# Patient Record
Sex: FEMALE | Race: ASIAN | URBAN - METROPOLITAN AREA
[De-identification: names, ages, dates, MRNs, and addresses within clinical notes are randomized per-mention and may not be internally consistent; named-entity substitution may affect disease eponyms.]

---

## 2017-04-26 LAB
HBV SURFACE AG SERPL QL IA: NORMAL
HIV 1+2 AB+HIV1 P24 AG SERPL QL IA: NORMAL
RUBELLA ABY IGG: NORMAL
T PALLIDUM IGG SER QL: NORMAL

## 2017-11-03 LAB — GROUP B STREP PCR: NEGATIVE

## 2017-12-01 ENCOUNTER — HOSPITAL ENCOUNTER (INPATIENT)
Facility: CLINIC | Age: 32
LOS: 2 days | Discharge: HOME | End: 2017-12-03
Attending: OBSTETRICS & GYNECOLOGY | Admitting: OBSTETRICS & GYNECOLOGY
Payer: COMMERCIAL

## 2017-12-01 LAB
ABO + RH BLD: NORMAL
ABO + RH BLD: NORMAL
SPECIMEN EXP DATE BLD: NORMAL

## 2017-12-01 PROCEDURE — 12000029 ZZH R&B OB INTERMEDIATE

## 2017-12-01 PROCEDURE — 0KQM0ZZ REPAIR PERINEUM MUSCLE, OPEN APPROACH: ICD-10-PCS | Performed by: OBSTETRICS & GYNECOLOGY

## 2017-12-01 PROCEDURE — 86780 TREPONEMA PALLIDUM: CPT | Performed by: OBSTETRICS & GYNECOLOGY

## 2017-12-01 PROCEDURE — 59025 FETAL NON-STRESS TEST: CPT

## 2017-12-01 PROCEDURE — 25000125 ZZHC RX 250: Performed by: OBSTETRICS & GYNECOLOGY

## 2017-12-01 PROCEDURE — 25000128 H RX IP 250 OP 636: Performed by: OBSTETRICS & GYNECOLOGY

## 2017-12-01 PROCEDURE — 72200001 ZZH LABOR CARE VAGINAL DELIVERY SINGLE

## 2017-12-01 PROCEDURE — 99215 OFFICE O/P EST HI 40 MIN: CPT

## 2017-12-01 PROCEDURE — 86900 BLOOD TYPING SEROLOGIC ABO: CPT | Performed by: OBSTETRICS & GYNECOLOGY

## 2017-12-01 PROCEDURE — 25000132 ZZH RX MED GY IP 250 OP 250 PS 637: Performed by: OBSTETRICS & GYNECOLOGY

## 2017-12-01 PROCEDURE — 86901 BLOOD TYPING SEROLOGIC RH(D): CPT | Performed by: OBSTETRICS & GYNECOLOGY

## 2017-12-01 RX ORDER — METHYLERGONOVINE MALEATE 0.2 MG/ML
200 INJECTION INTRAVENOUS
Status: DISCONTINUED | OUTPATIENT
Start: 2017-12-01 | End: 2017-12-02

## 2017-12-01 RX ORDER — PRENATAL VIT/IRON FUM/FOLIC AC 27MG-0.8MG
1 TABLET ORAL DAILY
COMMUNITY

## 2017-12-01 RX ORDER — ACETAMINOPHEN 325 MG/1
650 TABLET ORAL EVERY 4 HOURS PRN
Status: DISCONTINUED | OUTPATIENT
Start: 2017-12-01 | End: 2017-12-02

## 2017-12-01 RX ORDER — IBUPROFEN 800 MG/1
800 TABLET, FILM COATED ORAL
Status: COMPLETED | OUTPATIENT
Start: 2017-12-01 | End: 2017-12-01

## 2017-12-01 RX ORDER — FENTANYL CITRATE 50 UG/ML
50-100 INJECTION, SOLUTION INTRAMUSCULAR; INTRAVENOUS
Status: DISCONTINUED | OUTPATIENT
Start: 2017-12-01 | End: 2017-12-02

## 2017-12-01 RX ORDER — OXYCODONE AND ACETAMINOPHEN 5; 325 MG/1; MG/1
1 TABLET ORAL
Status: DISCONTINUED | OUTPATIENT
Start: 2017-12-01 | End: 2017-12-02

## 2017-12-01 RX ORDER — SODIUM CHLORIDE, SODIUM LACTATE, POTASSIUM CHLORIDE, CALCIUM CHLORIDE 600; 310; 30; 20 MG/100ML; MG/100ML; MG/100ML; MG/100ML
INJECTION, SOLUTION INTRAVENOUS CONTINUOUS
Status: DISCONTINUED | OUTPATIENT
Start: 2017-12-01 | End: 2017-12-02

## 2017-12-01 RX ORDER — ONDANSETRON 2 MG/ML
4 INJECTION INTRAMUSCULAR; INTRAVENOUS EVERY 6 HOURS PRN
Status: DISCONTINUED | OUTPATIENT
Start: 2017-12-01 | End: 2017-12-02

## 2017-12-01 RX ORDER — OXYTOCIN 10 [USP'U]/ML
10 INJECTION, SOLUTION INTRAMUSCULAR; INTRAVENOUS
Status: DISCONTINUED | OUTPATIENT
Start: 2017-12-01 | End: 2017-12-02

## 2017-12-01 RX ORDER — ONDANSETRON 2 MG/ML
4 INJECTION INTRAMUSCULAR; INTRAVENOUS EVERY 6 HOURS PRN
Status: DISCONTINUED | OUTPATIENT
Start: 2017-12-01 | End: 2017-12-01

## 2017-12-01 RX ORDER — OXYTOCIN/0.9 % SODIUM CHLORIDE 30/500 ML
100-340 PLASTIC BAG, INJECTION (ML) INTRAVENOUS CONTINUOUS PRN
Status: COMPLETED | OUTPATIENT
Start: 2017-12-01 | End: 2017-12-01

## 2017-12-01 RX ORDER — NALOXONE HYDROCHLORIDE 0.4 MG/ML
.1-.4 INJECTION, SOLUTION INTRAMUSCULAR; INTRAVENOUS; SUBCUTANEOUS
Status: DISCONTINUED | OUTPATIENT
Start: 2017-12-01 | End: 2017-12-02

## 2017-12-01 RX ORDER — CARBOPROST TROMETHAMINE 250 UG/ML
250 INJECTION, SOLUTION INTRAMUSCULAR
Status: DISCONTINUED | OUTPATIENT
Start: 2017-12-01 | End: 2017-12-02

## 2017-12-01 RX ADMIN — OXYTOCIN-SODIUM CHLORIDE 0.9% IV SOLN 30 UNIT/500ML 340 ML/HR: 30-0.9/5 SOLUTION at 23:25

## 2017-12-01 RX ADMIN — IBUPROFEN 800 MG: 800 TABLET, FILM COATED ORAL at 23:52

## 2017-12-01 RX ADMIN — SODIUM CHLORIDE, POTASSIUM CHLORIDE, SODIUM LACTATE AND CALCIUM CHLORIDE: 600; 310; 30; 20 INJECTION, SOLUTION INTRAVENOUS at 20:25

## 2017-12-01 RX ADMIN — LIDOCAINE HYDROCHLORIDE 20 ML: 10 INJECTION, SOLUTION INFILTRATION; PERINEURAL at 23:37

## 2017-12-01 NOTE — IP AVS SNAPSHOT
[facility]    [address] [facility] [place] [address]    Phone:  [phone]    Fax:  [phone]                                       After Visit Summary   12/1/2017    Guerda West    MRN: [id number]           After Visit Summary Signature Page     I have received my discharge instructions, and my questions have been answered. I have discussed any challenges I see with this plan with the nurse or doctor.    ..........................................................................................................................................  Patient/Patient Representative Signature      ..........................................................................................................................................  Patient Representative Print Name and Relationship to Patient    ..................................................               ................................................  Date                                            Time    ..........................................................................................................................................  Reviewed by Signature/Title    ...................................................              ..............................................  Date                                                            Time

## 2017-12-01 NOTE — PROVIDER NOTIFICATION
17 1745   Provider Notification   Provider Name/Title    Method of Notification In Department   Request Evaluate - Remote   Notification Reason Patient Arrived;Uterine Activity;Status Update;SVE     Dr. Tabor in department, updated on patient's arrival of being . Patient was seen in office this late afternoon and dilated 5-. Patient denies being aware of contractions, LOF or vaginal bleeding. External fetal monitor applied. Dr. Tabor viewed monitor tracing and will have patient ambulate for an hour and recheck. Data base completed.

## 2017-12-01 NOTE — IP AVS SNAPSHOT
MRN:[id number]                      After Visit Summary   12/1/2017    Guerda West    MRN: [id number]           Thank you!     Thank you for choosing [facility] for your care. Our goal is always to provide you with excellent care. Hearing back from our patients is one way we can continue to improve our services. Please take a few minutes to complete the written survey that you may receive in the mail after you visit. If you would like to speak to someone directly about your visit please contact Patient Relations at [phone]. Thank you!          Patient Information     Date Of Birth          1985        Designated Caregiver       Most Recent Value    Caregiver    Will someone help with your care after discharge? no      About your hospital stay     You were admitted on:  December 1, 2017 You last received care in the:  [facility]    You were discharged on:  December 3, 2017        Reason for your hospital stay       Maternity care                  Who to Call     For medical emergencies, please call 911.  For non-urgent questions about your medical care, please call your primary care provider or clinic, [phone]          Attending Provider     Provider Specialty    Zulema Tabor DO OB/Gyn       Primary Care Provider Office Phone # Fax #    [facility] [phone]      After Care Instructions     Activity       Review discharge instructions            Diet       Resume previous diet            Discharge Instructions - Postpartum visit       Schedule postpartum visit with your provider and return to clinic in 6 weeks.                  Follow-up Appointments     Follow Up and recommended labs and tests       6 weeks for postpartum visit                  Further instructions from your care team       Lactation Consultant [phone]    Home Care [phone]    Postpartum Vaginal Delivery Instructions    Activity        Ask family and friends for help when you need it.    Do not place anything in your vagina for 6 weeks.    You are not restricted on other activities, but take it easy for a few weeks to allow your body to recover from delivery.  You are able to do any activities you feel up to that point.    No driving until you have stopped taking your pain medications (usually two weeks after delivery).     Call your health care provider if you have any of these symptoms:       Increased pain, swelling, redness, or fluid around your stiches from an episiotomy or perineal tear.    A fever above 100.4 F (38 C) with or without chills when placing a thermometer under your tongue.    You soak a sanitary pad with blood within 1 hour, or you see blood clots larger than a golf ball.    Bleeding that lasts more than 6 weeks.    Vaginal discharge that smells bad.    Severe pain, cramping or tenderness in your lower belly area.    A need to urinate more frequently (use the toilet more often), more urgently (use the toilet very quickly), or it burns when you urinate.    Nausea and vomiting.    Redness, swelling or pain around a vein in your leg.    Problems breastfeeding or a red or painful area on your breast.    Chest pain and cough or are gasping for air.    Problems coping with sadness, anxiety, or depression.  If you have any concerns about hurting yourself or the baby, call your provider immediately.     You have questions or concerns after you return home.     Keep your hands clean:  Always wash your hands before touching your perineal area and stitches.  This helps reduce your risk of infection.  If your hands aren't dirty, you may use an alcohol hand-rub to clean your hands. Keep your nails clean and short.        Pending Results     No orders found from 11/29/2017 to 12/2/2017.            Statement of Approval     Ordered          12/03/17 1032  I have reviewed and agree with all the recommendations and orders detailed in this  "document.  EFFECTIVE NOW     Approved and electronically signed by:  Zulema Tabor DO             Admission Information     Date & Time Provider Department Dept. Phone    2017 Zulema Tabor DO [facility] [phone]      Your Vitals Were     Blood Pressure Pulse Temperature Respirations Height Weight    125/71 72 98.3  F (36.8  C) (Oral) 16 1.676 m (5' 6\") 95.7 kg (211 lb)    BMI (Body Mass Index)                   34.06 kg/m2           MyChart Information     [facility] lets you send messages to your doctor, view your test results, renew your prescriptions, schedule appointments and more. To sign up, go to [email or web address] . Click on \"Log in\" on the left side of the screen, which will take you to the Welcome page. Then click on \"Sign up Now\" on the right side of the page.     You will be asked to enter the access code listed below, as well as some personal information. Please follow the directions to create your username and password.     Your access code is: [email or web address]  Expires: 3/3/2018 10:02 AM     Your access code will  in 90 days. If you need help or a new code, please call your [facility] clinic or [phone].        Care EveryWhere ID     This is your Care EveryWhere ID. This could be used by other organizations to access your [facility] medical records  [id number]        Equal Access to Services     BRUCE [place] AH: Hadii robert molinao Soinessa, waaxda luqadaha, qaybta kaalmada sukhdev, daryl hernandez . So [facility] [phone].    ATENCIÓN: Si habla español, tiene a kelley disposición servicios gratuitos de asistencia lingüística. Llame al [phone].    We comply with applicable federal civil rights laws and [place] laws. We do not discriminate on the basis of race, color, national origin, age, disability, sex, sexual orientation, or gender identity.               Review of your medicines      START taking        Dose / " Directions    acetaminophen 325 MG tablet   Commonly known as:  TYLENOL        Dose:  650 mg   Take 2 tablets (650 mg) by mouth every 4 hours as needed for mild pain or fever (greater than or equal to 38? C /100.4? F (oral) or 38.5? C/ 101.4? F (core).)   Quantity:  100 tablet   Refills:  0         CONTINUE these medicines which have NOT CHANGED        Dose / Directions    BENADRYL PO        Dose:  50 mg   Take 50 mg by mouth daily as needed.   Refills:  0       ibuprofen 400-800 mg tablet   Commonly known as:  ADVIL,MOTRIN        Dose:  800 mg   Take 2 tablets by mouth every 6 hours as needed (cramping).   Quantity:  40 tablet   Refills:  1       prenatal multivitamin plus iron 27-0.8 MG Tabs per tablet        Dose:  1 tablet   Take 1 tablet by mouth daily   Refills:  0       VISTARIL PO   Indication:  restless leg syndrome        Dose:  25 mg   Take 25 mg by mouth daily   Refills:  0         STOP taking     nitroFURantoin (macrocrystal-monohydrate) 100 MG capsule   Commonly known as:  MACROBID                Where to get your medicines      Some of these will need a paper prescription and others can be bought over the counter. Ask your nurse if you have questions.     You don't need a prescription for these medications     acetaminophen 325 MG tablet                Protect others around you: Learn how to safely use, store and throw away your medicines at www.disposemymeds.org.             Medication List: This is a list of all your medications and when to take them. Check marks below indicate your daily home schedule. Keep this list as a reference.      Medications           Morning Afternoon Evening Bedtime As Needed    acetaminophen 325 MG tablet   Commonly known as:  TYLENOL   Take 2 tablets (650 mg) by mouth every 4 hours as needed for mild pain or fever (greater than or equal to 38? C /100.4? F (oral) or 38.5? C/ 101.4? F (core).)                                BENADRYL PO   Take 50 mg by mouth daily as  needed.                                ibuprofen 400-800 mg tablet   Commonly known as:  ADVIL,MOTRIN   Take 2 tablets by mouth every 6 hours as needed (cramping).                                prenatal multivitamin plus iron 27-0.8 MG Tabs per tablet   Take 1 tablet by mouth daily                                VISTARIL PO   Take 25 mg by mouth daily

## 2017-12-02 LAB — T PALLIDUM IGG+IGM SER QL: NEGATIVE

## 2017-12-02 PROCEDURE — 12000027 ZZH R&B OB

## 2017-12-02 PROCEDURE — 25000132 ZZH RX MED GY IP 250 OP 250 PS 637: Performed by: OBSTETRICS & GYNECOLOGY

## 2017-12-02 PROCEDURE — 25000125 ZZHC RX 250: Performed by: OBSTETRICS & GYNECOLOGY

## 2017-12-02 RX ORDER — AMOXICILLIN 250 MG
2 CAPSULE ORAL 2 TIMES DAILY PRN
Status: DISCONTINUED | OUTPATIENT
Start: 2017-12-02 | End: 2017-12-03 | Stop reason: HOSPADM

## 2017-12-02 RX ORDER — LANOLIN 100 %
OINTMENT (GRAM) TOPICAL
Status: DISCONTINUED | OUTPATIENT
Start: 2017-12-02 | End: 2017-12-03 | Stop reason: HOSPADM

## 2017-12-02 RX ORDER — IBUPROFEN 800 MG/1
800 TABLET, FILM COATED ORAL EVERY 6 HOURS PRN
Status: DISCONTINUED | OUTPATIENT
Start: 2017-12-02 | End: 2017-12-03 | Stop reason: HOSPADM

## 2017-12-02 RX ORDER — METHYLERGONOVINE MALEATE 0.2 MG/ML
200 INJECTION INTRAVENOUS
Status: DISCONTINUED | OUTPATIENT
Start: 2017-12-02 | End: 2017-12-03 | Stop reason: HOSPADM

## 2017-12-02 RX ORDER — OXYCODONE HYDROCHLORIDE 5 MG/1
5 TABLET ORAL EVERY 4 HOURS PRN
Status: DISCONTINUED | OUTPATIENT
Start: 2017-12-02 | End: 2017-12-03 | Stop reason: HOSPADM

## 2017-12-02 RX ORDER — OXYTOCIN/0.9 % SODIUM CHLORIDE 30/500 ML
340 PLASTIC BAG, INJECTION (ML) INTRAVENOUS CONTINUOUS PRN
Status: DISCONTINUED | OUTPATIENT
Start: 2017-12-02 | End: 2017-12-03 | Stop reason: HOSPADM

## 2017-12-02 RX ORDER — BISACODYL 10 MG
10 SUPPOSITORY, RECTAL RECTAL DAILY PRN
Status: DISCONTINUED | OUTPATIENT
Start: 2017-12-04 | End: 2017-12-03 | Stop reason: HOSPADM

## 2017-12-02 RX ORDER — OXYTOCIN 10 [USP'U]/ML
10 INJECTION, SOLUTION INTRAMUSCULAR; INTRAVENOUS
Status: DISCONTINUED | OUTPATIENT
Start: 2017-12-02 | End: 2017-12-03 | Stop reason: HOSPADM

## 2017-12-02 RX ORDER — HYDROCORTISONE 2.5 %
CREAM (GRAM) TOPICAL 3 TIMES DAILY PRN
Status: DISCONTINUED | OUTPATIENT
Start: 2017-12-02 | End: 2017-12-03 | Stop reason: HOSPADM

## 2017-12-02 RX ORDER — OXYTOCIN/0.9 % SODIUM CHLORIDE 30/500 ML
100 PLASTIC BAG, INJECTION (ML) INTRAVENOUS CONTINUOUS
Status: DISCONTINUED | OUTPATIENT
Start: 2017-12-02 | End: 2017-12-03 | Stop reason: HOSPADM

## 2017-12-02 RX ORDER — AMOXICILLIN 250 MG
1 CAPSULE ORAL 2 TIMES DAILY PRN
Status: DISCONTINUED | OUTPATIENT
Start: 2017-12-02 | End: 2017-12-03 | Stop reason: HOSPADM

## 2017-12-02 RX ORDER — NALOXONE HYDROCHLORIDE 0.4 MG/ML
.1-.4 INJECTION, SOLUTION INTRAMUSCULAR; INTRAVENOUS; SUBCUTANEOUS
Status: DISCONTINUED | OUTPATIENT
Start: 2017-12-02 | End: 2017-12-03 | Stop reason: HOSPADM

## 2017-12-02 RX ORDER — MISOPROSTOL 200 UG/1
400 TABLET ORAL
Status: DISCONTINUED | OUTPATIENT
Start: 2017-12-02 | End: 2017-12-03 | Stop reason: HOSPADM

## 2017-12-02 RX ORDER — ACETAMINOPHEN 325 MG/1
650 TABLET ORAL EVERY 4 HOURS PRN
Status: DISCONTINUED | OUTPATIENT
Start: 2017-12-02 | End: 2017-12-03 | Stop reason: HOSPADM

## 2017-12-02 RX ADMIN — OXYTOCIN-SODIUM CHLORIDE 0.9% IV SOLN 30 UNIT/500ML 100 ML/HR: 30-0.9/5 SOLUTION at 00:00

## 2017-12-02 RX ADMIN — IBUPROFEN 800 MG: 800 TABLET, FILM COATED ORAL at 15:16

## 2017-12-02 RX ADMIN — IBUPROFEN 800 MG: 800 TABLET, FILM COATED ORAL at 08:42

## 2017-12-02 RX ADMIN — IBUPROFEN 800 MG: 800 TABLET, FILM COATED ORAL at 23:30

## 2017-12-02 NOTE — PLAN OF CARE
Problem: Patient Care Overview  Goal: Plan of Care/Patient Progress Review  Outcome: No Change  Pt trying to rest between feedings. Had Motrin prior to transfer. Voiding on own. Monitor.

## 2017-12-02 NOTE — PROVIDER NOTIFICATION
12/01/17 1945   Provider Notification   Provider Name/Title Dr. Tabor   Method of Notification Phone   Request Evaluate - Remote   Notification Reason Fetal Baseline Change;Labor Status;Uterine Activity;Pain;SVE   Dr. Tabor updated regarding FHT, contractions x2, SVE 7/90/-1, patient reporting perineal pressure 4/10, which has not changed since patient admitted to triage. Orders received to admit patient for labor care, intermittent fetal monitoring ok'd by MD, patient ok to eat light dinner, recheck cervix in 1-2 hours and notify MD unless patient c/o increased pressure or contractions. Dr Tabor is at home, 20 minutes away from hospital.

## 2017-12-02 NOTE — PROVIDER NOTIFICATION
12/01/17 2130   Provider Notification   Provider Name/Title Dr Tabor   Method of Notification Phone   Request Attend Delivery   Notification Reason SVE;Labor Status;Uterine Activity;Pain   Dr Tabor updated regarding SVE: 9.5/95/-1. FHT category 1, contractions q 2-4 min and palpating strong. Pt tolerating pain and coping well. Dr. Tabor is on her way to hospital for delivery.

## 2017-12-02 NOTE — L&D DELIVERY NOTE
Guerda West is a 31 year old-year-old female,  3 para 2 with EDC 17, who was admitted for active labor at 40 weeks gestation. Her prenatal care was at the [facility] in [place]. Prenatal course was uncomplicated. Vaginal Group B Streptococcus culture was negative. Rh positive. Rubella immune. Hepatitis B negative.  The estimated fetal weight was 7lbs 1oz. Oxytocin induction/augmentation was not initiated per standard protocol for inadequate labor. Patient Did not receive an epidural for pain relief.  The patient achieved complete dilation at 2305. She went on to deliver 7#13oz female infant at 2321 by vaginal delivery. Apgars were 8 at one minute and 9 at five minutes. There was no nuchal cord. The placenta delivered spontaneously and intact, with a 3 vessel cord. The patient had a 2nd degree laceration(s) that was repaired with #3-0 vicryl.  QBL for the delivery was 696 mL.  Zulema Tabor

## 2017-12-02 NOTE — PLAN OF CARE
Data: Guerda West transferred to postpartum room 442 via wheelchair at 0230. Baby transferred via mother's arms.    Action: Receiving unit notified of transfer: Yes. Patient and family notified of room change. Report given to GUME Mohr at 0230. Belongings sent to receiving unit. Accompanied by Registered Nurse. Oriented patient to surroundings. Call light within reach. ID bands double-checked with receiving RN.    Response: Patient tolerated transfer and is stable.

## 2017-12-02 NOTE — PLAN OF CARE
Problem: Patient Care Overview  Goal: Plan of Care/Patient Progress Review  Outcome: Improving  UAL. States she is voiding without difficulty. Has taken Ibuprofen for uterine cramping this morning with a decrease in pain. Requesting 24 hour discharge. Meeting expected outcomes.

## 2017-12-02 NOTE — H&P
[facility]     History and Physical  Obstetrics and Gynecology     Date of Admission:  2017    History of Present Illness   Guerda West is a 31 year old female  40w3d with Estimated Date of Delivery: 2017 who presents with labor.  She was dilated 5-6cm and 90% in the clinic so she was sent over to labor and delivery for evaluation.      She [facility] in [facility].    PRENATAL COURSE  Prenatal care was received at [facility] and the  course was essentially uncomplicated      Recent Labs   Lab Test  17   ABO  A   RH  Pos     Rhogam not indicated   Recent Labs   Lab Test 17   HEPBANG   --   non-reactive   HIAGAB   --   non-reactive   GBS  negative   --        Past Medical History    I have reviewed this patient's medical history  History reviewed. No pertinent past medical history.    Past Surgical History   Past surgical history reviewed with no previous surgeries identified.    Prior to Admission Medications   Prior to Admission Medications   Prescriptions Last Dose Informant Patient Reported? Taking?   DiphenhydrAMINE HCl (BENADRYL PO)  Self Yes No   Sig: Take 50 mg by mouth daily as needed.   HydrOXYzine Pamoate (VISTARIL PO) 2017 at Unknown time  Yes Yes   Sig: Take 25 mg by mouth daily   Prenatal Vit-Fe Fumarate-FA (PRENATAL MULTIVITAMIN PLUS IRON) 27-0.8 MG TABS per tablet 2017 at Unknown time  Yes Yes   Sig: Take 1 tablet by mouth daily   ibuprofen (ADVIL,MOTRIN) 400-800 mg tablet   No No   Sig: Take 2 tablets by mouth every 6 hours as needed (cramping).   nitrofurantoin, macrocrystal-monohydrate, (MACROBID) 100 MG capsule   No No   Sig: Take 1 capsule (100 mg) by mouth 2 times daily      Facility-Administered Medications: None     Allergies   No Known Allergies    Social History   I have personally reviewed the social history with the patient    Family History   Family  history reviewed with patient and is noncontributory.    Immunization History   Immunization status is unknown    Physical Exam   Temp: 97.9  F (36.6  C) Temp src: Oral BP: 125/82     Resp: 16        Vital Signs with Ranges  Temp:  [97.5  F (36.4  C)-97.9  F (36.6  C)] 97.9  F (36.6  C)  Resp:  [16] 16  BP: (120-129)/(82-88) 125/82  Fetal Heart Tones: 145 baseline, moderate variablility, + accels, no decels and Category I  TOCO: frequency q 6-7 minutes    Constitutional: healthy, alert and active   Respiratory: No increased work of breathing, good air exchange, clear to auscultation bilaterally, no crackles or wheezing  Cardiovascular: Normal apical impulse, regular rate and rhythm, normal S1 and S2, no S3 or S4, and no murmur noted  Abdomen: gravid, single vertex fetus, non-tender, EFW 7 lbs 1  Cervical Exam: 9.5/ 90/ Anterior/ soft/ -1   Skin/Extremites: no bruising or bleeding      Assessment & Plan   Guerda West is a 31 year old female  who presents at 40w3d with active labor.   GBS negative.   NST reactive.  Category  I.     Admit - see IP orders  Anticipate       Zulema Tabor DO

## 2017-12-02 NOTE — PROVIDER NOTIFICATION
12/01/17 9424   Provider Notification   Provider Name/Title Dr. Tabor   Method of Notification Phone   Request Evaluate - Remote   Notification Reason Status Update     Patient placed back on monitor at 1840, FHR baseline 175-180 bpm with moderate variability noted. Dr. Tabor notified of baseline at this time and request to continue to monitor for another 30-45 minutes and then recheck cervix. Patient made aware of plan of care and agreeable with this plan.

## 2017-12-02 NOTE — LACTATION NOTE
Lactation visit for latching assistance. Mother states nipples are very tender, and  is not getting on deeply like she did right after birth.  very fussy and lacking any patience with latching, despite being calm and sleeping when writer entered room. Reviewed Latch 1,2,3 with mother and slightly altered positioning on  across mother's body, as well as encouraging cross cradle versus cradle for more head control with fussy . Once latched,  would suck a few times and swallow, then pull off or slightly pull back for more shallow and painful latch. Encouraged mother to break seal and re-latch with a good latch every time. Also encouraged breast compression to entice sustained latch with more milk flow. Mother states similar issues with other children. She was able to demonstrate proper latching independently but  continued to be fussy and not nursing well. Encouraged skin to skin to calm prior to re-latch. Mother will call if further questions.

## 2017-12-02 NOTE — PROGRESS NOTES
[facility] Obstetrics Post-Partum Progress Note          Assessment and Plan:    Assessment:   Post-partum day #1  Normal spontaneous vaginal delivery  L&D complications: None      Doing well.  No excessive bleeding  Pain well-controlled.      Plan:   Discharge later today           Interval History:   Doing well.  Pain is adequately controlled.  No fevers.  No history of foul-smelling vaginal discharge.  Good appetite.  Denies chest pain, shortness of breath, nausea or vomiting.  Vaginal bleeding is similar to a heavy menstrual flow.  Breastfeeding well.            Significant Problems:    None          Review of Systems:    The patient denies any chest pain, shortness of breath, excessive pain, fever, chills, purulent drainage from the wound, nausea or vomiting.          Medications:   All medications related to the patient's surgery have been reviewed          Physical Exam:   All vitals stable  Uterine fundus is firm, non-tender and at the level of the umbilicus          Data:     All imaging studies related to this surgery reviewed  Mitch Garduno

## 2017-12-02 NOTE — PROVIDER NOTIFICATION
12/01/17 2308   Provider Notification   Provider Name/Title Dr Tabor   Method of Notification At Bedside   Dr Tabor at bedside for delivery.

## 2017-12-02 NOTE — DISCHARGE SUMMARY
[facility] Discharge Summary    Guerda West MRN# [id number]   Age: 31 year old YOB: 1985     Date of Admission:  2017  Date of Discharge::  2017  Admitting Physician:  Zulema Tabor DO  Discharge Physician:  Mitch Garduno     Home clinic: [facility]          Admission Diagnoses:   PREGNANCY  Indication for care in labor or delivery  Indication for care in labor or delivery   (spontaneous vaginal delivery)          Discharge Diagnosis:   Normal spontaneous vaginal delivery  Intrauterine pregnancy at 40 weeks gestation          Procedures:   Procedure(s): No additional procedures performed       Vaginal birth           Medications Prior to Admission:   The patient was not taking any medications prior to admission          Discharge Medications:   No medications were prescribed on discharge          Consultations:   No consultations were requested during this admission          Brief History of Labor:   Presented from clinic with cx of 5 cm and progressed to complete delivering baby without issues           Hospital Course:   The patient's hospital course was unremarkable.  On discharge, her pain was well controlled. Vaginal bleeding is similar to peak menstrual flow.  Voiding without difficulty.  Ambulating well and tolerating a normal diet.  No fever.  Breastfeeding well.  Infant is stable.  No bowel movement yet.*  She was discharged on post-partum day #1.    Post-partum hemoglobin:   Hemoglobin   Date Value Ref Range Status   10/18/2012 12.6 11.7 - 15.7 g/dL Final             Discharge Instructions and Follow-Up:   Discharge diet: Regular   Discharge activity: Activity as tolerated   Discharge follow-up: Follow up with primary care provider in 6 weeks   Wound care: Drink plenty of fluids           Discharge Disposition:   Discharged to home      Attestation:  I have reviewed today's vital signs, notes, medications, labs and imaging.    Mitch Garduno

## 2017-12-03 VITALS
BODY MASS INDEX: 33.91 KG/M2 | DIASTOLIC BLOOD PRESSURE: 71 MMHG | TEMPERATURE: 97.7 F | HEIGHT: 66 IN | RESPIRATION RATE: 16 BRPM | HEART RATE: 72 BPM | WEIGHT: 211 LBS | SYSTOLIC BLOOD PRESSURE: 125 MMHG

## 2017-12-03 LAB — HGB BLD-MCNC: 9.6 G/DL (ref 11.7–15.7)

## 2017-12-03 PROCEDURE — 85018 HEMOGLOBIN: CPT | Performed by: OBSTETRICS & GYNECOLOGY

## 2017-12-03 PROCEDURE — 40000084 ZZH STATISTIC IP LACTATION SERVICES 16-30 MIN

## 2017-12-03 PROCEDURE — 36415 COLL VENOUS BLD VENIPUNCTURE: CPT | Performed by: OBSTETRICS & GYNECOLOGY

## 2017-12-03 RX ORDER — ACETAMINOPHEN 325 MG/1
650 TABLET ORAL EVERY 4 HOURS PRN
Qty: 100 TABLET | COMMUNITY
Start: 2017-12-03

## 2017-12-03 NOTE — PLAN OF CARE
Problem: Patient Care Overview  Goal: Plan of Care/Patient Progress Review  Outcome: Adequate for Discharge Date Met: 12/03/17  UAL.VSS. States she is voiding without difficulty. Both parents attentive to baby. Discharge instructions completed. Patient states she understands all discharge instructions and all her questions have been answered. Verbalizes when she needs to return to the clinic for follow up for herself and baby. She is caring for herself and her baby independently. Discharged to [facility] with baby at 1101.

## 2017-12-03 NOTE — PLAN OF CARE
Problem: Postpartum (Vaginal Delivery) (Adult,Obstetrics,Pediatric)  Goal: Signs and Symptoms of Listed Potential Problems Will be Absent, Minimized or Managed (Postpartum)  Signs and symptoms of listed potential problems will be absent, minimized or managed by discharge/transition of care (reference Postpartum (Vaginal Delivery) (Adult,Obstetrics,Pediatric) CPG).   Outcome: Improving      Pt plans for discharge after 24 hours. Discharge orders are in. Education done , no further questions.

## 2017-12-03 NOTE — PLAN OF CARE
Problem: Patient Care Overview  Goal: Plan of Care/Patient Progress Review  Outcome: Improving  Pt able to get small periods of rest between cares during the night w/  rooming-in.  States ordered pain medications working well to decrease cramping discomfort.  Also using nipple cream for tender nipple.  Has small band-aid covering abrasion under umbilicus that is from prior to delivery.  Pt and spouse upset, but verbalize understanding reasoning they were not d/c'ed tonight d/t 's jaundice.  Spouse present and very helpful w/ cares.  Continue to monitor.

## 2017-12-03 NOTE — LACTATION NOTE
"Lactation visit. Patient states that her nipples continue to be sore. Upon observation, noted her right nipple to have the start of a crack along the base of her nipple in the 1-2 o'clock position. It also appeared pointed or angled. She had just finished a feeding,  swaddled and crying in father's arms. Patient feels  is nursing well and that she is sucking well and hearing swallows, but it is painful with latching. Encouraged trying different positions, such as laid back or side lying to aid in deeper latch. Offered assistance to latch in either position since  was still crying, but patient declined. She expressed determination to breast feed exclusively with this child as she \"gave up too easily\" with her other two. Encouraged her to call for f/u outpatient appointment. Gave her Motherlove cream and hydrogel pads for comfort.  "

## 2017-12-03 NOTE — DISCHARGE INSTRUCTIONS
Lactation Consultant [phone]    [facility] [phone]    Postpartum Vaginal Delivery Instructions    Activity       Ask family and friends for help when you need it.    Do not place anything in your vagina for 6 weeks.    You are not restricted on other activities, but take it easy for a few weeks to allow your body to recover from delivery.  You are able to do any activities you feel up to that point.    No driving until you have stopped taking your pain medications (usually two weeks after delivery).     Call your health care provider if you have any of these symptoms:       Increased pain, swelling, redness, or fluid around your stiches from an episiotomy or perineal tear.    A fever above 100.4 F (38 C) with or without chills when placing a thermometer under your tongue.    You soak a sanitary pad with blood within 1 hour, or you see blood clots larger than a golf ball.    Bleeding that lasts more than 6 weeks.    Vaginal discharge that smells bad.    Severe pain, cramping or tenderness in your lower belly area.    A need to urinate more frequently (use the toilet more often), more urgently (use the toilet very quickly), or it burns when you urinate.    Nausea and vomiting.    Redness, swelling or pain around a vein in your leg.    Problems breastfeeding or a red or painful area on your breast.    Chest pain and cough or are gasping for air.    Problems coping with sadness, anxiety, or depression.  If you have any concerns about hurting yourself or the baby, call your provider immediately.     You have questions or concerns after you return home.     Keep your hands clean:  Always wash your hands before touching your perineal area and stitches.  This helps reduce your risk of infection.  If your hands aren't dirty, you may use an alcohol hand-rub to clean your hands. Keep your nails clean and short.

## 2017-12-03 NOTE — PROGRESS NOTES
"[facility] Postpartum Note    S:  [place] feels good this morning. Was able to sleep last night. Pain control adequate. Lochia minimal. Voiding. Breast feeding. Mood Good. Patient wanted to go home yesterday but baby had to stay.      O:  Vitals were reviewed  Blood pressure 125/71, pulse 72, temperature 98.3  F (36.8  C), temperature source Oral, resp. rate 16, height 1.676 m (5' 6\"), weight 95.7 kg (211 lb), unknown if currently breastfeeding.      General: healthy, alert and no distress  Abd: soft, appropriately tender, fundus firm  Legs: Non-tender, 0+ pitting edema    RH(D)   Date Value Ref Range Status   12/01/2017 Pos  Final     Rubella: immune    Assessment and Plan:   Postpartum Day #2, status post vaginal delivery, doing well.  -- Discharge home  -- F/U 6 weeks w/ Primary OB  -- Discharge meds: OTC tylenol and motamerico Tabor DO  "